# Patient Record
Sex: MALE | Race: WHITE | Employment: OTHER | URBAN - NONMETROPOLITAN AREA
[De-identification: names, ages, dates, MRNs, and addresses within clinical notes are randomized per-mention and may not be internally consistent; named-entity substitution may affect disease eponyms.]

---

## 2018-09-18 ENCOUNTER — HOSPITAL ENCOUNTER (OUTPATIENT)
Age: 78
Setting detail: SPECIMEN
Discharge: HOME | End: 2018-09-18
Payer: MEDICARE

## 2018-09-18 ENCOUNTER — OFFICE VISIT (OUTPATIENT)
Dept: UROLOGY | Age: 78
End: 2018-09-18
Payer: MEDICARE

## 2018-09-18 VITALS
HEIGHT: 72 IN | WEIGHT: 206 LBS | DIASTOLIC BLOOD PRESSURE: 106 MMHG | HEART RATE: 80 BPM | SYSTOLIC BLOOD PRESSURE: 160 MMHG | TEMPERATURE: 98.2 F | BODY MASS INDEX: 27.9 KG/M2

## 2018-09-18 DIAGNOSIS — R31.0 GROSS HEMATURIA: ICD-10-CM

## 2018-09-18 DIAGNOSIS — R31.0 GROSS HEMATURIA: Primary | ICD-10-CM

## 2018-09-18 PROBLEM — R31.9 HEMATURIA: Status: ACTIVE | Noted: 2018-09-18

## 2018-09-18 LAB
-: ABNORMAL
AMORPHOUS: ABNORMAL
BACTERIA: ABNORMAL
BILIRUBIN URINE: ABNORMAL
CASTS UA: ABNORMAL /LPF
COLOR: ABNORMAL
COMMENT UA: ABNORMAL
CRYSTALS, UA: ABNORMAL /HPF
EPITHELIAL CELLS UA: ABNORMAL /HPF (ref 0–5)
GLUCOSE URINE: NEGATIVE
KETONES, URINE: NEGATIVE
LEUKOCYTE ESTERASE, URINE: ABNORMAL
MUCUS: ABNORMAL
NITRITE, URINE: POSITIVE
OTHER OBSERVATIONS UA: ABNORMAL
PH UA: 6 (ref 5–9)
PROTEIN UA: ABNORMAL
RBC UA: ABNORMAL /HPF (ref 0–2)
RENAL EPITHELIAL, UA: ABNORMAL /HPF
SPECIFIC GRAVITY UA: 1.02 (ref 1.01–1.02)
TRICHOMONAS: ABNORMAL
TURBIDITY: ABNORMAL
URINE HGB: ABNORMAL
UROBILINOGEN, URINE: NORMAL
WBC UA: ABNORMAL /HPF (ref 0–5)
YEAST: ABNORMAL

## 2018-09-18 PROCEDURE — G8427 DOCREV CUR MEDS BY ELIG CLIN: HCPCS | Performed by: NURSE PRACTITIONER

## 2018-09-18 PROCEDURE — 4004F PT TOBACCO SCREEN RCVD TLK: CPT | Performed by: NURSE PRACTITIONER

## 2018-09-18 PROCEDURE — 87086 URINE CULTURE/COLONY COUNT: CPT

## 2018-09-18 PROCEDURE — G8419 CALC BMI OUT NRM PARAM NOF/U: HCPCS | Performed by: NURSE PRACTITIONER

## 2018-09-18 PROCEDURE — 1101F PT FALLS ASSESS-DOCD LE1/YR: CPT | Performed by: NURSE PRACTITIONER

## 2018-09-18 PROCEDURE — 99204 OFFICE O/P NEW MOD 45 MIN: CPT | Performed by: NURSE PRACTITIONER

## 2018-09-18 PROCEDURE — 4040F PNEUMOC VAC/ADMIN/RCVD: CPT | Performed by: NURSE PRACTITIONER

## 2018-09-18 PROCEDURE — 81001 URINALYSIS AUTO W/SCOPE: CPT

## 2018-09-18 PROCEDURE — 1123F ACP DISCUSS/DSCN MKR DOCD: CPT | Performed by: NURSE PRACTITIONER

## 2018-09-18 ASSESSMENT — ENCOUNTER SYMPTOMS
COUGH: 0
VOMITING: 0
COLOR CHANGE: 0
ABDOMINAL PAIN: 0
BACK PAIN: 0
SHORTNESS OF BREATH: 0
NAUSEA: 0
CONSTIPATION: 0
EYE REDNESS: 0
WHEEZING: 0
EYE PAIN: 0

## 2018-09-18 NOTE — PROGRESS NOTES
Subjective:      Patient ID: Shellie Chiu is a 69 y.o. male. HPI  Patient is a 69 y.o. male in no acute distress and alert and oriented to person, place and time. New patient referral from [facility] ER for gross hematuria. Patient has had gross hematuria for the last 5-6 days. He has had no pain with urination, flank or abdominal pain. He did go to the ER for evaluation on 9/14/2018. Renal labs reviewed: BUN 15.0, creatinine 0.93, GFR >60. Urinalysis showed trace of the sites and negative nitrite. No urine culture was completed. No imaging was obtained. Patient is a current smoker. He has smoked a 0.25 pack per day for 60 years. He did previously work in construction. He does follow with a urologist in [place]. He does spend 6 months of the year down in [place]. He reports he had a TURP completed 10 years ago for an enlarged prostate. He is no longer taking medications for BPH. Patient states since the surgery he has had intermittent gross hematuria. He denies ever having a CT completed. He reports he has had multiple cystoscopies and was told he is bleeding from \"scar tissue\". He reports his previous urologist give him \"60 of Cipro to have on hand to take if needed for hematuria. \"  He has also been following with his urologist for an elevated PSA. He states he has had a PSA as high as 13. He denies any current dysuria, frequency, urgency, incontinence, or nocturia. He does continue to have gross hematuria. PVR is 104 ML. Past Medical History:   Diagnosis Date    Enlarged prostate      Past Surgical History:   Procedure Laterality Date    TURP       Outpatient Encounter Prescriptions as of 9/18/2018   Medication Sig Dispense Refill    Multiple Vitamins-Minerals (MULTIVITAL PO) Take by mouth daily       No facility-administered encounter medications on file as of 9/18/2018. No current outpatient prescriptions on file prior to visit.      No current facility-administered medications

## 2018-09-19 LAB
CULTURE: NO GROWTH
Lab: NORMAL
SPECIMEN DESCRIPTION: NORMAL
STATUS: NORMAL

## 2018-09-20 ENCOUNTER — TELEPHONE (OUTPATIENT)
Dept: UROLOGY | Age: 78
End: 2018-09-20

## 2018-09-28 ENCOUNTER — HOSPITAL ENCOUNTER (OUTPATIENT)
Dept: CT IMAGING | Age: 78
Discharge: HOME | End: 2018-09-30
Payer: MEDICARE

## 2018-09-28 DIAGNOSIS — R31.0 GROSS HEMATURIA: ICD-10-CM

## 2018-09-28 PROCEDURE — 74178 CT ABD&PLV WO CNTR FLWD CNTR: CPT

## 2018-09-28 PROCEDURE — 6360000004 HC RX CONTRAST MEDICATION: Performed by: NURSE PRACTITIONER

## 2018-09-28 RX ADMIN — IOPAMIDOL 120 ML: 755 INJECTION, SOLUTION INTRAVENOUS at 11:19

## 2018-10-03 ENCOUNTER — HOSPITAL ENCOUNTER (OUTPATIENT)
Age: 78
Discharge: HOME | End: 2018-10-03
Payer: MEDICARE

## 2018-10-03 ENCOUNTER — TELEPHONE (OUTPATIENT)
Dept: UROLOGY | Age: 78
End: 2018-10-03

## 2018-10-03 ENCOUNTER — PROCEDURE VISIT (OUTPATIENT)
Dept: UROLOGY | Age: 78
End: 2018-10-03
Payer: MEDICARE

## 2018-10-03 VITALS — BODY MASS INDEX: 27.94 KG/M2 | WEIGHT: 206 LBS | DIASTOLIC BLOOD PRESSURE: 80 MMHG | SYSTOLIC BLOOD PRESSURE: 120 MMHG

## 2018-10-03 DIAGNOSIS — R31.0 GROSS HEMATURIA: Primary | ICD-10-CM

## 2018-10-03 DIAGNOSIS — N28.89 RENAL MASS: ICD-10-CM

## 2018-10-03 DIAGNOSIS — N21.0 BLADDER CALCULUS: ICD-10-CM

## 2018-10-03 LAB
EKG ATRIAL RATE: 68 BPM
EKG P AXIS: 61 DEGREES
EKG P-R INTERVAL: 188 MS
EKG Q-T INTERVAL: 406 MS
EKG QRS DURATION: 76 MS
EKG QTC CALCULATION (BAZETT): 412 MS
EKG R AXIS: 1 DEGREES
EKG T AXIS: 56 DEGREES
EKG VENTRICULAR RATE: 62 BPM

## 2018-10-03 PROCEDURE — G8484 FLU IMMUNIZE NO ADMIN: HCPCS | Performed by: UROLOGY

## 2018-10-03 PROCEDURE — 99214 OFFICE O/P EST MOD 30 MIN: CPT | Performed by: UROLOGY

## 2018-10-03 PROCEDURE — 4004F PT TOBACCO SCREEN RCVD TLK: CPT | Performed by: UROLOGY

## 2018-10-03 PROCEDURE — 1123F ACP DISCUSS/DSCN MKR DOCD: CPT | Performed by: UROLOGY

## 2018-10-03 PROCEDURE — 4040F PNEUMOC VAC/ADMIN/RCVD: CPT | Performed by: UROLOGY

## 2018-10-03 PROCEDURE — 93005 ELECTROCARDIOGRAM TRACING: CPT

## 2018-10-03 PROCEDURE — G8427 DOCREV CUR MEDS BY ELIG CLIN: HCPCS | Performed by: UROLOGY

## 2018-10-03 PROCEDURE — 1101F PT FALLS ASSESS-DOCD LE1/YR: CPT | Performed by: UROLOGY

## 2018-10-03 PROCEDURE — G8419 CALC BMI OUT NRM PARAM NOF/U: HCPCS | Performed by: UROLOGY

## 2018-10-03 ASSESSMENT — ENCOUNTER SYMPTOMS
NAUSEA: 0
COUGH: 0
WHEEZING: 0
SHORTNESS OF BREATH: 0
EYE PAIN: 0
ABDOMINAL PAIN: 0
COLOR CHANGE: 0
BACK PAIN: 0
VOMITING: 0
EYE REDNESS: 0

## 2018-10-03 NOTE — TELEPHONE ENCOUNTER
Patient called back this afternoon stating that he has decided to have surgery in [place]. 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 He states he would be there for 3 months. He is asking if records can be sent to urologist, Dr. Mickey Gonzalez in [place]. Fax #[phone].   Phone # [phone]

## 2025-03-18 ENCOUNTER — HOSPITAL ENCOUNTER (OUTPATIENT)
Dept: PREADMISSION TESTING | Age: 85
Discharge: HOME | End: 2025-03-22
Attending: ORTHOPAEDIC SURGERY | Admitting: ORTHOPAEDIC SURGERY
Payer: MEDICARE

## 2025-03-18 ENCOUNTER — HOSPITAL ENCOUNTER (OUTPATIENT)
Dept: GENERAL RADIOLOGY | Age: 85
Discharge: HOME | End: 2025-03-20
Attending: ORTHOPAEDIC SURGERY
Payer: MEDICARE

## 2025-03-18 VITALS
HEART RATE: 70 BPM | RESPIRATION RATE: 14 BRPM | BODY MASS INDEX: 29.8 KG/M2 | DIASTOLIC BLOOD PRESSURE: 79 MMHG | WEIGHT: 220 LBS | TEMPERATURE: 97.4 F | HEIGHT: 72 IN | OXYGEN SATURATION: 94 % | SYSTOLIC BLOOD PRESSURE: 151 MMHG

## 2025-03-18 LAB
ABO + RH BLD: NORMAL
ALBUMIN SERPL-MCNC: 4 G/DL (ref 3.5–5.2)
ALBUMIN/GLOB SERPL: 1.4 {RATIO} (ref 1–2.5)
ALP SERPL-CCNC: 105 U/L (ref 40–129)
ALT SERPL-CCNC: 17 U/L (ref 10–50)
ANION GAP SERPL CALCULATED.3IONS-SCNC: 8 MMOL/L (ref 9–16)
ARM BAND NUMBER: NORMAL
AST SERPL-CCNC: 21 U/L (ref 10–50)
BASOPHILS # BLD: 0.06 K/UL (ref 0–0.2)
BASOPHILS NFR BLD: 1 % (ref 0–2)
BILIRUB SERPL-MCNC: 0.2 MG/DL (ref 0–1.2)
BILIRUB UR QL STRIP: NEGATIVE
BLOOD BANK SAMPLE EXPIRATION: NORMAL
BLOOD GROUP ANTIBODIES SERPL: NEGATIVE
BUN SERPL-MCNC: 14 MG/DL (ref 8–23)
BUN/CREAT SERPL: 16 (ref 9–20)
CALCIUM SERPL-MCNC: 9.2 MG/DL (ref 8.6–10.4)
CHLORIDE SERPL-SCNC: 108 MMOL/L (ref 98–107)
CLARITY UR: CLEAR
CO2 SERPL-SCNC: 28 MMOL/L (ref 20–31)
COLOR UR: YELLOW
CREAT SERPL-MCNC: 0.9 MG/DL (ref 0.7–1.2)
EKG ATRIAL RATE: 67 BPM
EKG P AXIS: 41 DEGREES
EKG P-R INTERVAL: 196 MS
EKG Q-T INTERVAL: 380 MS
EKG QRS DURATION: 70 MS
EKG QTC CALCULATION (BAZETT): 401 MS
EKG R AXIS: -26 DEGREES
EKG T AXIS: 54 DEGREES
EKG VENTRICULAR RATE: 67 BPM
EOSINOPHIL # BLD: 0.1 K/UL (ref 0–0.44)
EOSINOPHILS RELATIVE PERCENT: 2 % (ref 1–4)
EPI CELLS #/AREA URNS HPF: ABNORMAL /HPF (ref 0–5)
ERYTHROCYTE [DISTWIDTH] IN BLOOD BY AUTOMATED COUNT: 13.5 % (ref 11.8–14.4)
GFR, ESTIMATED: 80 ML/MIN/1.73M2
GLUCOSE SERPL-MCNC: 87 MG/DL (ref 74–99)
GLUCOSE UR STRIP-MCNC: NEGATIVE MG/DL
HCT VFR BLD AUTO: 45.6 % (ref 40.7–50.3)
HGB BLD-MCNC: 15 G/DL (ref 13–17)
HGB UR QL STRIP.AUTO: NEGATIVE
IMM GRANULOCYTES # BLD AUTO: <0.03 K/UL (ref 0–0.3)
IMM GRANULOCYTES NFR BLD: 0 %
INR PPP: 1
KETONES UR STRIP-MCNC: NEGATIVE MG/DL
LEUKOCYTE ESTERASE UR QL STRIP: NEGATIVE
LYMPHOCYTES NFR BLD: 1.73 K/UL (ref 1.1–3.7)
LYMPHOCYTES RELATIVE PERCENT: 29 % (ref 24–43)
MCH RBC QN AUTO: 31.2 PG (ref 25.2–33.5)
MCHC RBC AUTO-ENTMCNC: 32.9 G/DL (ref 28.4–34.8)
MCV RBC AUTO: 94.8 FL (ref 82.6–102.9)
MONOCYTES NFR BLD: 0.75 K/UL (ref 0.1–1.2)
MONOCYTES NFR BLD: 13 % (ref 3–12)
MUCOUS THREADS URNS QL MICRO: ABNORMAL
NEUTROPHILS NFR BLD: 55 % (ref 36–65)
NEUTS SEG NFR BLD: 3.26 K/UL (ref 1.5–8.1)
NITRITE UR QL STRIP: NEGATIVE
NRBC BLD-RTO: 0 PER 100 WBC
PARTIAL THROMBOPLASTIN TIME: 27.2 SEC (ref 26.8–34.8)
PH UR STRIP: 6 [PH] (ref 5–9)
PLATELET # BLD AUTO: 252 K/UL (ref 138–453)
PMV BLD AUTO: 10 FL (ref 8.1–13.5)
POTASSIUM SERPL-SCNC: 3.9 MMOL/L (ref 3.7–5.3)
PROT SERPL-MCNC: 6.9 G/DL (ref 6.6–8.7)
PROT UR STRIP-MCNC: ABNORMAL MG/DL
PROTHROMBIN TIME: 12.7 SEC (ref 11.7–14.1)
RBC # BLD AUTO: 4.81 M/UL (ref 4.21–5.77)
RBC #/AREA URNS HPF: ABNORMAL /HPF (ref 0–2)
SODIUM SERPL-SCNC: 144 MMOL/L (ref 136–145)
SP GR UR STRIP: >1.03 (ref 1.01–1.02)
UROBILINOGEN UR STRIP-ACNC: NORMAL EU/DL (ref 0–1)
WBC #/AREA URNS HPF: ABNORMAL /HPF (ref 0–5)
WBC OTHER # BLD: 5.9 K/UL (ref 3.5–11.3)

## 2025-03-18 PROCEDURE — 86900 BLOOD TYPING SEROLOGIC ABO: CPT

## 2025-03-18 PROCEDURE — 71046 X-RAY EXAM CHEST 2 VIEWS: CPT

## 2025-03-18 PROCEDURE — 86901 BLOOD TYPING SEROLOGIC RH(D): CPT

## 2025-03-18 PROCEDURE — 93005 ELECTROCARDIOGRAM TRACING: CPT | Performed by: ORTHOPAEDIC SURGERY

## 2025-03-18 PROCEDURE — 86850 RBC ANTIBODY SCREEN: CPT

## 2025-03-18 PROCEDURE — 85025 COMPLETE CBC W/AUTO DIFF WBC: CPT

## 2025-03-18 PROCEDURE — 93010 ELECTROCARDIOGRAM REPORT: CPT | Performed by: INTERNAL MEDICINE

## 2025-03-18 PROCEDURE — 81001 URINALYSIS AUTO W/SCOPE: CPT

## 2025-03-18 PROCEDURE — 85610 PROTHROMBIN TIME: CPT

## 2025-03-18 PROCEDURE — 85730 THROMBOPLASTIN TIME PARTIAL: CPT

## 2025-03-18 PROCEDURE — 36415 COLL VENOUS BLD VENIPUNCTURE: CPT

## 2025-03-18 PROCEDURE — 80053 COMPREHEN METABOLIC PANEL: CPT

## 2025-03-18 PROCEDURE — 87641 MR-STAPH DNA AMP PROBE: CPT

## 2025-03-18 RX ORDER — TAMSULOSIN HYDROCHLORIDE 0.4 MG/1
0.4 CAPSULE ORAL PRN
COMMUNITY

## 2025-03-18 RX ORDER — IBUPROFEN 200 MG
200 TABLET ORAL PRN
COMMUNITY

## 2025-03-18 RX ORDER — PANTOPRAZOLE SODIUM 20 MG/1
20 TABLET, DELAYED RELEASE ORAL PRN
COMMUNITY

## 2025-03-18 RX ORDER — ASPIRIN 325 MG
325 TABLET ORAL PRN
COMMUNITY

## 2025-03-18 RX ORDER — ACETAMINOPHEN 325 MG/1
650 TABLET ORAL PRN
COMMUNITY

## 2025-03-18 RX ORDER — LOSARTAN POTASSIUM 50 MG/1
50 TABLET ORAL DAILY
COMMUNITY

## 2025-03-18 ASSESSMENT — PAIN DESCRIPTION - ORIENTATION: ORIENTATION: RIGHT

## 2025-03-18 ASSESSMENT — PAIN DESCRIPTION - LOCATION: LOCATION: KNEE

## 2025-03-18 ASSESSMENT — PAIN SCALES - GENERAL: PAINLEVEL_OUTOF10: 5

## 2025-03-18 NOTE — PROGRESS NOTES
[facility]   Preadmission Testing    Name: Jaden Doe  : 1940  Patient Phone: [phone] (home)     Procedure Right TKA  Date of Procedure: 25  Surgeon: Koffi Perez MD    Ht:  182.9 cm (6')  Wt: 99.8 kg (220 lb)  Wt method: Stated    Allergies: No Known Allergies    Peanut allergy: No         Vitals:    25 1307   BP: (!) 151/79   Pulse: 70   Resp: 14   Temp: 97.4 °F (36.3 °C)   SpO2: 94%       No LMP for male patient.    Do you take blood thinners?   [x] Yes    [x] No         Instructed to stop blood thinners prior to procedure?    [x] Yes    [] No      [] N/A   Do you have sleep apnea?   [] Yes    [x] No     Instructed to bring CPAP machine?   [] Yes    [] No    [x] N/A   Do you have acid reflux ?   [x] Yes    [] No     Do you have  hiatal hernia?   [] Yes    [x] No    Do you ever experience motion sickness?   [] Yes    [x] No     Have you had a respiratory infection or sore throat in last 4 weeks before surgery?    [] Yes    [x] No     Do you have poorly controlled asthma or COPD?   [] Yes    [x] No     Do you have a history of angina in the last month or symptomatic arrhythmia?   [] Yes    [x] No     Do you have significant central nervous system disease?   [] Yes    [x] No     Have you had an EKG, labs, or chest xray in last 12 months?  If yes provide copies to anesthesia   [] Yes    [x] No       [] Lab    [] EKG    [] CXR     Have you had a stress test?     [x] Yes    [] No    When/where:10yrs ago/[place]    Was it normal?    [x] Yes    [] No   Do you or your family have a history of Malignant Hyperthermia? [] Yes    [x] No     Patient instructed on:   [x] NPO Status   [x] Meds to Take Day of Surgery  [x] Ride Home  [x]No Jewelry/Contact Lenses/Dentures day of surgery   [x] Chlorhexidene                       Patient instructed on the pre-operative, intra-operative, and post-operative process?   Yes  Medication instructions reviewed with patient?  Yes  Pre operative

## 2025-03-19 LAB
MRSA, DNA, NASAL: ABNORMAL
SPECIMEN DESCRIPTION: ABNORMAL

## 2025-03-27 ENCOUNTER — TELEPHONE (OUTPATIENT)
Dept: PREADMISSION TESTING | Age: 85
End: 2025-03-27

## 2025-04-02 ENCOUNTER — ANESTHESIA EVENT (OUTPATIENT)
Dept: OPERATING ROOM | Age: 85
End: 2025-04-02
Payer: MEDICARE

## 2025-04-03 ENCOUNTER — APPOINTMENT (OUTPATIENT)
Dept: GENERAL RADIOLOGY | Age: 85
End: 2025-04-03
Attending: ORTHOPAEDIC SURGERY
Payer: MEDICARE

## 2025-04-03 ENCOUNTER — HOSPITAL ENCOUNTER (OUTPATIENT)
Age: 85
Setting detail: OBSERVATION
Discharge: HOME | End: 2025-04-04
Attending: ORTHOPAEDIC SURGERY | Admitting: ORTHOPAEDIC SURGERY
Payer: MEDICARE

## 2025-04-03 ENCOUNTER — ANESTHESIA (OUTPATIENT)
Dept: OPERATING ROOM | Age: 85
End: 2025-04-03
Payer: MEDICARE

## 2025-04-03 DIAGNOSIS — Z96.651 S/P TOTAL KNEE ARTHROPLASTY, RIGHT: Primary | ICD-10-CM

## 2025-04-03 PROCEDURE — C1713 ANCHOR/SCREW BN/BN,TIS/BN: HCPCS | Performed by: ORTHOPAEDIC SURGERY

## 2025-04-03 PROCEDURE — 2720000010 HC SURG SUPPLY STERILE: Performed by: ORTHOPAEDIC SURGERY

## 2025-04-03 PROCEDURE — 3700000000 HC ANESTHESIA ATTENDED CARE: Performed by: ORTHOPAEDIC SURGERY

## 2025-04-03 PROCEDURE — 73560 X-RAY EXAM OF KNEE 1 OR 2: CPT

## 2025-04-03 PROCEDURE — 6370000000 HC RX 637 (ALT 250 FOR IP): Performed by: PHYSICIAN ASSISTANT

## 2025-04-03 PROCEDURE — 2709999900 HC NON-CHARGEABLE SUPPLY: Performed by: ORTHOPAEDIC SURGERY

## 2025-04-03 PROCEDURE — 3600000015 HC SURGERY LEVEL 5 ADDTL 15MIN: Performed by: ORTHOPAEDIC SURGERY

## 2025-04-03 PROCEDURE — 94761 N-INVAS EAR/PLS OXIMETRY MLT: CPT

## 2025-04-03 PROCEDURE — 6370000000 HC RX 637 (ALT 250 FOR IP): Performed by: NURSE ANESTHETIST, CERTIFIED REGISTERED

## 2025-04-03 PROCEDURE — G0378 HOSPITAL OBSERVATION PER HR: HCPCS

## 2025-04-03 PROCEDURE — 2580000003 HC RX 258

## 2025-04-03 PROCEDURE — 6360000002 HC RX W HCPCS

## 2025-04-03 PROCEDURE — 3700000001 HC ADD 15 MINUTES (ANESTHESIA): Performed by: ORTHOPAEDIC SURGERY

## 2025-04-03 PROCEDURE — 2500000003 HC RX 250 WO HCPCS: Performed by: ORTHOPAEDIC SURGERY

## 2025-04-03 PROCEDURE — 6360000002 HC RX W HCPCS: Performed by: PHYSICIAN ASSISTANT

## 2025-04-03 PROCEDURE — 2580000003 HC RX 258: Performed by: PHYSICIAN ASSISTANT

## 2025-04-03 PROCEDURE — 64447 NJX AA&/STRD FEMORAL NRV IMG: CPT | Performed by: NURSE ANESTHETIST, CERTIFIED REGISTERED

## 2025-04-03 PROCEDURE — 2580000003 HC RX 258: Performed by: NURSE ANESTHETIST, CERTIFIED REGISTERED

## 2025-04-03 PROCEDURE — 3600000005 HC SURGERY LEVEL 5 BASE: Performed by: ORTHOPAEDIC SURGERY

## 2025-04-03 PROCEDURE — 2500000003 HC RX 250 WO HCPCS: Performed by: PHYSICIAN ASSISTANT

## 2025-04-03 PROCEDURE — C1776 JOINT DEVICE (IMPLANTABLE): HCPCS | Performed by: ORTHOPAEDIC SURGERY

## 2025-04-03 PROCEDURE — 7100000000 HC PACU RECOVERY - FIRST 15 MIN: Performed by: ORTHOPAEDIC SURGERY

## 2025-04-03 PROCEDURE — 51798 US URINE CAPACITY MEASURE: CPT

## 2025-04-03 PROCEDURE — 7100000001 HC PACU RECOVERY - ADDTL 15 MIN: Performed by: ORTHOPAEDIC SURGERY

## 2025-04-03 PROCEDURE — 2500000003 HC RX 250 WO HCPCS: Performed by: NURSE ANESTHETIST, CERTIFIED REGISTERED

## 2025-04-03 DEVICE — COMPONENT PAT DIA38MM THK9.5MM STD VIVACIT-E CEM INSET FOR: Type: IMPLANTABLE DEVICE | Site: KNEE | Status: FUNCTIONAL

## 2025-04-03 DEVICE — CEMENT BNE 40GM HI VISC RADPQ FOR REV SURG: Type: IMPLANTABLE DEVICE | Site: KNEE | Status: FUNCTIONAL

## 2025-04-03 DEVICE — IMPLANTABLE DEVICE: Type: IMPLANTABLE DEVICE | Site: KNEE | Status: FUNCTIONAL

## 2025-04-03 DEVICE — PSN TIB STM 5 DEG SZ H R: Type: IMPLANTABLE DEVICE | Site: KNEE | Status: FUNCTIONAL

## 2025-04-03 DEVICE — PSN MC VE ASF R 10MM 8-11 GH: Type: IMPLANTABLE DEVICE | Site: KNEE | Status: FUNCTIONAL

## 2025-04-03 RX ORDER — ONDANSETRON 4 MG/1
4 TABLET, ORALLY DISINTEGRATING ORAL EVERY 8 HOURS PRN
Status: DISCONTINUED | OUTPATIENT
Start: 2025-04-03 | End: 2025-04-04 | Stop reason: HOSPADM

## 2025-04-03 RX ORDER — SODIUM CHLORIDE 9 MG/ML
INJECTION, SOLUTION INTRAVENOUS PRN
Status: DISCONTINUED | OUTPATIENT
Start: 2025-04-03 | End: 2025-04-03 | Stop reason: HOSPADM

## 2025-04-03 RX ORDER — BUPIVACAINE HYDROCHLORIDE 7.5 MG/ML
INJECTION, SOLUTION INTRASPINAL
Status: DISCONTINUED | OUTPATIENT
Start: 2025-04-03 | End: 2025-04-03 | Stop reason: SDUPTHER

## 2025-04-03 RX ORDER — SODIUM CHLORIDE 0.9 % (FLUSH) 0.9 %
5-40 SYRINGE (ML) INJECTION PRN
Status: DISCONTINUED | OUTPATIENT
Start: 2025-04-03 | End: 2025-04-03 | Stop reason: HOSPADM

## 2025-04-03 RX ORDER — OXYCODONE HYDROCHLORIDE 5 MG/1
10 TABLET ORAL PRN
Status: DISCONTINUED | OUTPATIENT
Start: 2025-04-03 | End: 2025-04-03 | Stop reason: HOSPADM

## 2025-04-03 RX ORDER — DEXAMETHASONE SODIUM PHOSPHATE 10 MG/ML
INJECTION, SOLUTION INTRA-ARTICULAR; INTRALESIONAL; INTRAMUSCULAR; INTRAVENOUS; SOFT TISSUE
Status: DISCONTINUED | OUTPATIENT
Start: 2025-04-03 | End: 2025-04-03 | Stop reason: SDUPTHER

## 2025-04-03 RX ORDER — SODIUM CHLORIDE 9 MG/ML
INJECTION, SOLUTION INTRAVENOUS CONTINUOUS
Status: DISCONTINUED | OUTPATIENT
Start: 2025-04-03 | End: 2025-04-03

## 2025-04-03 RX ORDER — HYDRALAZINE HYDROCHLORIDE 20 MG/ML
10 INJECTION INTRAMUSCULAR; INTRAVENOUS ONCE
Status: COMPLETED | OUTPATIENT
Start: 2025-04-03 | End: 2025-04-03

## 2025-04-03 RX ORDER — OXYCODONE HYDROCHLORIDE 5 MG/1
10 TABLET ORAL EVERY 4 HOURS PRN
Status: DISCONTINUED | OUTPATIENT
Start: 2025-04-03 | End: 2025-04-04 | Stop reason: HOSPADM

## 2025-04-03 RX ORDER — BUPIVACAINE/KETOROLAC/KETAMINE 150-60/50
SYRINGE (ML) INJECTION PRN
Status: DISCONTINUED | OUTPATIENT
Start: 2025-04-03 | End: 2025-04-03 | Stop reason: ALTCHOICE

## 2025-04-03 RX ORDER — DIPHENHYDRAMINE HYDROCHLORIDE 50 MG/ML
25 INJECTION, SOLUTION INTRAMUSCULAR; INTRAVENOUS EVERY 6 HOURS PRN
Status: DISCONTINUED | OUTPATIENT
Start: 2025-04-03 | End: 2025-04-04 | Stop reason: HOSPADM

## 2025-04-03 RX ORDER — TAMSULOSIN HYDROCHLORIDE 0.4 MG/1
0.4 CAPSULE ORAL PRN
Status: DISCONTINUED | OUTPATIENT
Start: 2025-04-03 | End: 2025-04-03

## 2025-04-03 RX ORDER — NALOXONE HYDROCHLORIDE 0.4 MG/ML
INJECTION, SOLUTION INTRAMUSCULAR; INTRAVENOUS; SUBCUTANEOUS PRN
Status: DISCONTINUED | OUTPATIENT
Start: 2025-04-03 | End: 2025-04-03 | Stop reason: HOSPADM

## 2025-04-03 RX ORDER — SODIUM CHLORIDE 0.9 % (FLUSH) 0.9 %
5-40 SYRINGE (ML) INJECTION EVERY 12 HOURS SCHEDULED
Status: DISCONTINUED | OUTPATIENT
Start: 2025-04-03 | End: 2025-04-04 | Stop reason: HOSPADM

## 2025-04-03 RX ORDER — ACETAMINOPHEN 325 MG/1
650 TABLET ORAL EVERY 6 HOURS
Status: DISCONTINUED | OUTPATIENT
Start: 2025-04-03 | End: 2025-04-04 | Stop reason: HOSPADM

## 2025-04-03 RX ORDER — GLYCOPYRROLATE 0.2 MG/ML
INJECTION INTRAMUSCULAR; INTRAVENOUS
Status: DISCONTINUED | OUTPATIENT
Start: 2025-04-03 | End: 2025-04-03 | Stop reason: SDUPTHER

## 2025-04-03 RX ORDER — ONDANSETRON 2 MG/ML
4 INJECTION INTRAMUSCULAR; INTRAVENOUS
Status: DISCONTINUED | OUTPATIENT
Start: 2025-04-03 | End: 2025-04-03 | Stop reason: HOSPADM

## 2025-04-03 RX ORDER — ACETAMINOPHEN 325 MG/1
650 TABLET ORAL ONCE
Status: DISCONTINUED | OUTPATIENT
Start: 2025-04-03 | End: 2025-04-03 | Stop reason: HOSPADM

## 2025-04-03 RX ORDER — FENTANYL CITRATE 50 UG/ML
25 INJECTION, SOLUTION INTRAMUSCULAR; INTRAVENOUS EVERY 5 MIN PRN
Status: DISCONTINUED | OUTPATIENT
Start: 2025-04-03 | End: 2025-04-03 | Stop reason: HOSPADM

## 2025-04-03 RX ORDER — ROPIVACAINE HYDROCHLORIDE 5 MG/ML
INJECTION, SOLUTION EPIDURAL; INFILTRATION; PERINEURAL
Status: DISCONTINUED | OUTPATIENT
Start: 2025-04-03 | End: 2025-04-03

## 2025-04-03 RX ORDER — NAPROXEN 375 MG/1
375 TABLET ORAL 2 TIMES DAILY WITH MEALS
COMMUNITY

## 2025-04-03 RX ORDER — ROPIVACAINE HYDROCHLORIDE 5 MG/ML
INJECTION, SOLUTION EPIDURAL; INFILTRATION; PERINEURAL
Status: DISCONTINUED | OUTPATIENT
Start: 2025-04-03 | End: 2025-04-03 | Stop reason: SDUPTHER

## 2025-04-03 RX ORDER — SODIUM CHLORIDE 0.9 % (FLUSH) 0.9 %
5-40 SYRINGE (ML) INJECTION EVERY 12 HOURS SCHEDULED
Status: DISCONTINUED | OUTPATIENT
Start: 2025-04-03 | End: 2025-04-03 | Stop reason: HOSPADM

## 2025-04-03 RX ORDER — SODIUM CHLORIDE 9 MG/ML
INJECTION, SOLUTION INTRAMUSCULAR; INTRAVENOUS; SUBCUTANEOUS
Status: DISCONTINUED | OUTPATIENT
Start: 2025-04-03 | End: 2025-04-03 | Stop reason: SDUPTHER

## 2025-04-03 RX ORDER — BUPIVACAINE/KETOROLAC/KETAMINE 150-60/50
SYRINGE (ML) INJECTION
Status: DISPENSED
Start: 2025-04-03 | End: 2025-04-04

## 2025-04-03 RX ORDER — FENTANYL CITRATE 50 UG/ML
INJECTION, SOLUTION INTRAMUSCULAR; INTRAVENOUS
Status: DISCONTINUED | OUTPATIENT
Start: 2025-04-03 | End: 2025-04-03 | Stop reason: SDUPTHER

## 2025-04-03 RX ORDER — SODIUM CHLORIDE 9 MG/ML
INJECTION, SOLUTION INTRAVENOUS PRN
Status: DISCONTINUED | OUTPATIENT
Start: 2025-04-03 | End: 2025-04-04 | Stop reason: HOSPADM

## 2025-04-03 RX ORDER — OXYCODONE HYDROCHLORIDE 5 MG/1
5 TABLET ORAL EVERY 6 HOURS PRN
Qty: 20 TABLET | Refills: 0 | Status: SHIPPED | OUTPATIENT
Start: 2025-04-03 | End: 2025-04-08

## 2025-04-03 RX ORDER — SODIUM CHLORIDE, SODIUM LACTATE, POTASSIUM CHLORIDE, CALCIUM CHLORIDE 600; 310; 30; 20 MG/100ML; MG/100ML; MG/100ML; MG/100ML
INJECTION, SOLUTION INTRAVENOUS CONTINUOUS
Status: DISCONTINUED | OUTPATIENT
Start: 2025-04-03 | End: 2025-04-03 | Stop reason: HOSPADM

## 2025-04-03 RX ORDER — CEFAZOLIN SODIUM/WATER 2 G/20 ML
2000 SYRINGE (ML) INTRAVENOUS
Status: COMPLETED | OUTPATIENT
Start: 2025-04-03 | End: 2025-04-03

## 2025-04-03 RX ORDER — PROPOFOL 10 MG/ML
INJECTION, EMULSION INTRAVENOUS
Status: DISCONTINUED | OUTPATIENT
Start: 2025-04-03 | End: 2025-04-03 | Stop reason: SDUPTHER

## 2025-04-03 RX ORDER — DIPHENHYDRAMINE HCL 25 MG
25 CAPSULE ORAL EVERY 6 HOURS PRN
Status: DISCONTINUED | OUTPATIENT
Start: 2025-04-03 | End: 2025-04-04 | Stop reason: HOSPADM

## 2025-04-03 RX ORDER — DEXAMETHASONE SODIUM PHOSPHATE 4 MG/ML
4 INJECTION, SOLUTION INTRA-ARTICULAR; INTRALESIONAL; INTRAMUSCULAR; INTRAVENOUS; SOFT TISSUE
Status: DISCONTINUED | OUTPATIENT
Start: 2025-04-03 | End: 2025-04-03 | Stop reason: HOSPADM

## 2025-04-03 RX ORDER — TRANEXAMIC ACID 100 MG/ML
INJECTION, SOLUTION INTRAVENOUS
Status: DISCONTINUED | OUTPATIENT
Start: 2025-04-03 | End: 2025-04-03 | Stop reason: SDUPTHER

## 2025-04-03 RX ORDER — CEFAZOLIN SODIUM/WATER 2 G/20 ML
2000 SYRINGE (ML) INTRAVENOUS EVERY 8 HOURS
Status: DISCONTINUED | OUTPATIENT
Start: 2025-04-03 | End: 2025-04-04 | Stop reason: HOSPADM

## 2025-04-03 RX ORDER — BISACODYL 5 MG/1
5 TABLET, DELAYED RELEASE ORAL DAILY PRN
Status: DISCONTINUED | OUTPATIENT
Start: 2025-04-03 | End: 2025-04-04 | Stop reason: HOSPADM

## 2025-04-03 RX ORDER — ACETAMINOPHEN 500 MG
1000 TABLET ORAL ONCE
Status: COMPLETED | OUTPATIENT
Start: 2025-04-03 | End: 2025-04-03

## 2025-04-03 RX ORDER — ASPIRIN 325 MG
325 TABLET, DELAYED RELEASE (ENTERIC COATED) ORAL DAILY
Status: DISCONTINUED | OUTPATIENT
Start: 2025-04-04 | End: 2025-04-04 | Stop reason: HOSPADM

## 2025-04-03 RX ORDER — POLYETHYLENE GLYCOL 3350 17 G/17G
17 POWDER, FOR SOLUTION ORAL DAILY
Status: DISCONTINUED | OUTPATIENT
Start: 2025-04-03 | End: 2025-04-04 | Stop reason: HOSPADM

## 2025-04-03 RX ORDER — ONDANSETRON 2 MG/ML
INJECTION INTRAMUSCULAR; INTRAVENOUS
Status: DISCONTINUED | OUTPATIENT
Start: 2025-04-03 | End: 2025-04-03 | Stop reason: SDUPTHER

## 2025-04-03 RX ORDER — DEXAMETHASONE SODIUM PHOSPHATE 4 MG/ML
INJECTION, SOLUTION INTRA-ARTICULAR; INTRALESIONAL; INTRAMUSCULAR; INTRAVENOUS; SOFT TISSUE
Status: DISCONTINUED | OUTPATIENT
Start: 2025-04-03 | End: 2025-04-03 | Stop reason: SDUPTHER

## 2025-04-03 RX ORDER — PANTOPRAZOLE SODIUM 20 MG/1
20 TABLET, DELAYED RELEASE ORAL DAILY PRN
Status: DISCONTINUED | OUTPATIENT
Start: 2025-04-03 | End: 2025-04-04 | Stop reason: HOSPADM

## 2025-04-03 RX ORDER — OXYCODONE HYDROCHLORIDE 5 MG/1
5 TABLET ORAL PRN
Status: DISCONTINUED | OUTPATIENT
Start: 2025-04-03 | End: 2025-04-03 | Stop reason: HOSPADM

## 2025-04-03 RX ORDER — ONDANSETRON 2 MG/ML
4 INJECTION INTRAMUSCULAR; INTRAVENOUS EVERY 6 HOURS PRN
Status: DISCONTINUED | OUTPATIENT
Start: 2025-04-03 | End: 2025-04-04 | Stop reason: HOSPADM

## 2025-04-03 RX ORDER — TAMSULOSIN HYDROCHLORIDE 0.4 MG/1
0.4 CAPSULE ORAL DAILY
Status: DISCONTINUED | OUTPATIENT
Start: 2025-04-03 | End: 2025-04-04 | Stop reason: HOSPADM

## 2025-04-03 RX ORDER — OXYCODONE HYDROCHLORIDE 5 MG/1
5 TABLET ORAL EVERY 4 HOURS PRN
Status: DISCONTINUED | OUTPATIENT
Start: 2025-04-03 | End: 2025-04-04 | Stop reason: HOSPADM

## 2025-04-03 RX ORDER — DIMENHYDRINATE 50 MG
50 TABLET ORAL ONCE
Status: COMPLETED | OUTPATIENT
Start: 2025-04-03 | End: 2025-04-03

## 2025-04-03 RX ORDER — FENTANYL CITRATE 50 UG/ML
50 INJECTION, SOLUTION INTRAMUSCULAR; INTRAVENOUS EVERY 5 MIN PRN
Status: DISCONTINUED | OUTPATIENT
Start: 2025-04-03 | End: 2025-04-03 | Stop reason: HOSPADM

## 2025-04-03 RX ORDER — LOSARTAN POTASSIUM 50 MG/1
50 TABLET ORAL DAILY
Status: DISCONTINUED | OUTPATIENT
Start: 2025-04-04 | End: 2025-04-04 | Stop reason: HOSPADM

## 2025-04-03 RX ORDER — SODIUM CHLORIDE 0.9 % (FLUSH) 0.9 %
5-40 SYRINGE (ML) INJECTION PRN
Status: DISCONTINUED | OUTPATIENT
Start: 2025-04-03 | End: 2025-04-04 | Stop reason: HOSPADM

## 2025-04-03 RX ORDER — SODIUM CHLORIDE 9 MG/ML
INJECTION, SOLUTION INTRAVENOUS CONTINUOUS
Status: DISCONTINUED | OUTPATIENT
Start: 2025-04-03 | End: 2025-04-03 | Stop reason: HOSPADM

## 2025-04-03 RX ORDER — SENNOSIDES A AND B 8.6 MG/1
1 TABLET, FILM COATED ORAL DAILY PRN
Status: DISCONTINUED | OUTPATIENT
Start: 2025-04-03 | End: 2025-04-04 | Stop reason: HOSPADM

## 2025-04-03 RX ADMIN — SODIUM CHLORIDE 20 ML: 9 INJECTION, SOLUTION INTRAMUSCULAR; INTRAVENOUS; SUBCUTANEOUS at 11:38

## 2025-04-03 RX ADMIN — SODIUM CHLORIDE, POTASSIUM CHLORIDE, SODIUM LACTATE AND CALCIUM CHLORIDE: 600; 310; 30; 20 INJECTION, SOLUTION INTRAVENOUS at 11:15

## 2025-04-03 RX ADMIN — ONDANSETRON 8 MG: 2 INJECTION, SOLUTION INTRAMUSCULAR; INTRAVENOUS at 12:32

## 2025-04-03 RX ADMIN — SODIUM CHLORIDE: 0.9 INJECTION, SOLUTION INTRAVENOUS at 17:11

## 2025-04-03 RX ADMIN — FENTANYL CITRATE 100 MCG: 50 INJECTION INTRAMUSCULAR; INTRAVENOUS at 11:38

## 2025-04-03 RX ADMIN — TRANEXAMIC ACID 1000 MG: 100 INJECTION, SOLUTION INTRAVENOUS at 12:48

## 2025-04-03 RX ADMIN — TRANEXAMIC ACID 1000 MG: 100 INJECTION, SOLUTION INTRAVENOUS at 14:37

## 2025-04-03 RX ADMIN — PROPOFOL 70 MG: 10 INJECTION, EMULSION INTRAVENOUS at 12:43

## 2025-04-03 RX ADMIN — ACETAMINOPHEN 650 MG: 325 TABLET ORAL at 17:02

## 2025-04-03 RX ADMIN — ACETAMINOPHEN 650 MG: 325 TABLET ORAL at 23:00

## 2025-04-03 RX ADMIN — SODIUM CHLORIDE, PRESERVATIVE FREE 10 ML: 5 INJECTION INTRAVENOUS at 20:50

## 2025-04-03 RX ADMIN — Medication 2000 MG: at 12:46

## 2025-04-03 RX ADMIN — SODIUM CHLORIDE, POTASSIUM CHLORIDE, SODIUM LACTATE AND CALCIUM CHLORIDE: 600; 310; 30; 20 INJECTION, SOLUTION INTRAVENOUS at 13:05

## 2025-04-03 RX ADMIN — ACETAMINOPHEN 1000 MG: 500 TABLET ORAL at 11:12

## 2025-04-03 RX ADMIN — POLYETHYLENE GLYCOL 3350 17 G: 17 POWDER, FOR SOLUTION ORAL at 17:22

## 2025-04-03 RX ADMIN — DEXAMETHASONE SODIUM PHOSPHATE 4 MG: 4 INJECTION INTRA-ARTICULAR; INTRALESIONAL; INTRAMUSCULAR; INTRAVENOUS; SOFT TISSUE at 12:50

## 2025-04-03 RX ADMIN — ROPIVACAINE HYDROCHLORIDE 15 ML: 5 INJECTION EPIDURAL; INFILTRATION; PERINEURAL at 11:38

## 2025-04-03 RX ADMIN — DEXAMETHASONE SODIUM PHOSPHATE 10 MG: 10 INJECTION INTRAMUSCULAR; INTRAVENOUS at 11:38

## 2025-04-03 RX ADMIN — Medication 2000 MG: at 20:50

## 2025-04-03 RX ADMIN — GLYCOPYRROLATE 0.2 MG: 0.2 INJECTION INTRAMUSCULAR; INTRAVENOUS at 13:08

## 2025-04-03 RX ADMIN — PROPOFOL 80 MCG/KG/MIN: 10 INJECTION, EMULSION INTRAVENOUS at 12:44

## 2025-04-03 RX ADMIN — HYDRALAZINE HYDROCHLORIDE 10 MG: 20 INJECTION INTRAMUSCULAR; INTRAVENOUS at 15:58

## 2025-04-03 RX ADMIN — DIMENHYDRINATE 50 MG: 50 TABLET ORAL at 11:12

## 2025-04-03 RX ADMIN — TAMSULOSIN HYDROCHLORIDE 0.4 MG: 0.4 CAPSULE ORAL at 17:34

## 2025-04-03 RX ADMIN — BUPIVACAINE HYDROCHLORIDE IN DEXTROSE 1.6 ML: 7.5 INJECTION, SOLUTION SUBARACHNOID at 12:41

## 2025-04-03 ASSESSMENT — PAIN SCALES - GENERAL
PAINLEVEL_OUTOF10: 0
PAINLEVEL_OUTOF10: 0
PAINLEVEL_OUTOF10: 2
PAINLEVEL_OUTOF10: 0

## 2025-04-03 ASSESSMENT — PAIN DESCRIPTION - ORIENTATION: ORIENTATION: RIGHT

## 2025-04-03 ASSESSMENT — PAIN DESCRIPTION - ONSET: ONSET: ON-GOING

## 2025-04-03 ASSESSMENT — PAIN DESCRIPTION - PAIN TYPE: TYPE: ACUTE PAIN

## 2025-04-03 ASSESSMENT — PAIN - FUNCTIONAL ASSESSMENT
PAIN_FUNCTIONAL_ASSESSMENT: NONE - DENIES PAIN
PAIN_FUNCTIONAL_ASSESSMENT: ACTIVITIES ARE NOT PREVENTED
PAIN_FUNCTIONAL_ASSESSMENT: FACE, LEGS, ACTIVITY, CRY, AND CONSOLABILITY (FLACC)

## 2025-04-03 ASSESSMENT — PAIN DESCRIPTION - DESCRIPTORS: DESCRIPTORS: DISCOMFORT

## 2025-04-03 ASSESSMENT — PAIN DESCRIPTION - LOCATION: LOCATION: LEG;HIP

## 2025-04-03 ASSESSMENT — PAIN DESCRIPTION - FREQUENCY: FREQUENCY: CONTINUOUS

## 2025-04-03 NOTE — ANESTHESIA PRE PROCEDURE
Department of Anesthesiology  Preprocedure Note       Name:  Jaden Doe   Age:  85 y.o.  :  1940                                          MRN:  [id number]         Date:  4/3/2025      Surgeon: Surgeon(s):  Koffi Perez MD    Procedure: Procedure(s):  KNEE TOTAL ARTHROPLASTY    Medications prior to admission:   Prior to Admission medications    Medication Sig Start Date End Date Taking? Authorizing Provider   naproxen (NAPROSYN) 375 MG tablet Take 1 tablet by mouth 2 times daily (with meals)   Yes ProviderSara MD   losartan (COZAAR) 50 MG tablet Take 1 tablet by mouth daily   Yes ProviderSara MD   tamsulosin (FLOMAX) 0.4 MG capsule Take 1 capsule by mouth as needed (bladder issues)   Yes ProviderSara MD   pantoprazole (PROTONIX) 20 MG tablet Take 1 tablet by mouth as needed (acid reflux)   Yes ProviderSara MD   ibuprofen (ADVIL;MOTRIN) 200 MG tablet Take 1 tablet by mouth as needed for Pain   Yes ProviderSara MD   Multiple Vitamins-Minerals (MULTIVITAL PO) Take by mouth daily   Yes Provider, MD Sara   acetaminophen (TYLENOL) 325 MG tablet Take 2 tablets by mouth as needed for Pain    ProviderSara MD   aspirin 325 MG tablet Take 1 tablet by mouth as needed for Pain    ProviderSara MD       Current medications:    Current Facility-Administered Medications   Medication Dose Route Frequency Provider Last Rate Last Admin    acetaminophen (TYLENOL) tablet 650 mg  650 mg Oral Once Orlando Stone APRN - CRNA        lactated ringers infusion   IntraVENous Continuous Orlando Stone APRN - CRNA 100 mL/hr at 25 1115 New Bag at 25 1115    tranexamic acid (CYKLOKAPRON) 1,000 mg in sodium chloride 0.9 % 100 mL IVPB ([id number])  1,000 mg IntraVENous On Call to OR Alex Lopes PA-C        tranexamic acid (CYKLOKAPRON) 1,000 mg in sodium chloride 0.9 % 100 mL IVPB ([id number])  1,000 mg IntraVENous Once PRN Alex Lopes PA-C

## 2025-04-03 NOTE — DISCHARGE INSTRUCTIONS
Total Joint Replacement  Discharge Instructions    To prevent Clot formation, you have been placed on an anticoagulant  Surgical Site Care:  Dressing to remain clean, dry, and intact until follow up in office in 2 weeks  Sutures may be present and will likely be removed at 2 week follow up   Showering is permitted over dressing as long as dressing remains fully intact. Do no shower if dressing is peeling or compromised  Physical Therapy:  Full Weight Bearing Status  Precautions   Per Physical Therapy handout  Pain Medications  You were given pain medication  Wean off pain medications as you deem appropriate as long as pain is under control  Cold packs/Ice packs/Machine  May be used 3 times daily for 15-30 minutes as necessary  Be sure to have a barrier (cloth, clothing, towel) between the site and the ice pack to prevent frostbite  Contact [facility] office if  Increased redness, swelling, drainage of any kind, and/or pain to surgery site.  As well as new onset fevers and or chills.  These could signify an infection.  Calf or thigh tenderness to touch as well as increased swelling or redness.  This could signify a clot formation.  Numbness or tingling to an area around the incision site or below the incision site (toes).  Any rash appears, increased  or new onset nausea/vomiting occur.  This may indicate a reaction to a medication.   Phone # [phone] - [facility]  Follow up with Surgeon at scheduled appointment time.     Azathioprine Pregnancy And Lactation Text: This medication is Pregnancy Category D and isn't considered safe during pregnancy. It is unknown if this medication is excreted in breast milk.

## 2025-04-03 NOTE — OP NOTE
Department of Orthopaedic Surgery  Operative Report      Patient:  Jaden Doe    YOB: 1940    MRN:  [id number]    Date of Surgery:  4/3/2025    Pre-operative Diagnosis:    1.  Right knee pain  2.  Right knee osteoarthritis    Post-operative Diagnosis:    1.  Right knee pain  2.  Right knee osteoarthritis    Procedure:    1.  Right total knee arthroplasty    Surgeon:  Koffi Perez MD     Assistant(s):    First Assistant: Moriah Quintanilla PA-C, assisted throughout the procedure with positioning, draping, retraction, wound closure, and dressing application.     Second Assistant: Scott Lopes PA-C, assisted throughout the procedure with positioning, draping, retraction, wound closure, and dressing application.     Anesthesia:  Spinal     Estimated blood loss:  100 mL     Fluids:  1400 mL crystalloid     Urine:  None      Specimens:  Bony cuts     Findings:  Tricompartmental osteoarthritis, Valgus deformity     Complications:  None     Condition:  Stable     Indications for Surgery:  Patient is an 86 y/o male who presented with right knee pain.  The patient exhausted nonoperative interventions for knee pain and arthritis and continued to have debilitating pain. Radiographs demonstrated bone on bone arthritis. Pain was affecting his quality of life and he desired to proceed with total knee arthroplasty. Discussed procedure, risks, benefits, and alternatives with patient including but not limited to bleeding, infection, neurovascular injury, knee stiffness, fracture, VTE, need for additional surgery, continued pain, and risk of anesthesia. Patient understood the risks and elected to proceed with surgery.      Implants:    Alize Persona   CR size 11 femur  Tibia size H  10 mm MC polyethylene  Patella 38 mm     Procedure:  Patient was identified and greeted in the preoperative holding area. The correct surgical site was identified and marked. Surgical consent was obtained and placed on the chart. Patient was  polyethylene was inserted. The knee capsule was then injected with joint cocktail. The knee was well irrigated and the tourniquet released. Hemostasis was obtained. The joint capsule was then closed with #2 stratafix. Final sponge and needle counts were correct. The skin and subcutaneous tissue were then closed with 2-0 monocryl and 3-0 stratafix. Sterile dressings of Prineo, 4x4, and tegaderm were applied. Patient was awakened and transferred to the patient bed. Patient was taken to PACU in stable condition.      Disposition:    Admit for pain control and IV antibiotics. PT/OT. IV antibiotics x24 hrs. WBAT. XR right knee in PACU. PO pain control. DVT ppx  mg daily, SCDs, TEDS. Nothing behind knee while lying in bed. Ice and elevation to decrease swelling.  Dressing to remain in place until follow-up in 2 weeks.  Anticipate d/c home in AM.       Koffi Perez MD  Orthopaedic Surgeon  [facility]  4/3/2025  2:52 PM

## 2025-04-03 NOTE — ANESTHESIA PROCEDURE NOTES
Spinal Block    End time: 4/3/2025 12:41 PM  Reason for block: primary anesthetic and at surgeon's request  Staffing  Performed: resident/CRNA   Resident/CRNA: Marybel Ames APRN - CRNA  Performed by: Marybel Ames APRN - CRNA  Authorized by: Marybel Ames APRN - CRNA    Spinal Block  Patient position: sitting  Prep: ChloraPrep  Patient monitoring: continuous pulse ox and frequent blood pressure checks  Approach: midline  Location: L4/L5  Guidance: paresthesia technique  Provider prep: mask and sterile gloves  Needle  Needle type: Pencan   Needle gauge: 25 G  Needle length: 3.5 in  Expiration date: 11/30/2026  Assessment  Sensory level: T10  Events: cerebrospinal fluid  Swirl obtained: Yes  CSF: clear  Attempts: 1  Hemodynamics: stable  Additional Notes  1.6 mL bupivacaine 0.75% with dextrose 8.25%  Preanesthetic Checklist  Completed: patient identified, IV checked, site marked, risks and benefits discussed, surgical/procedural consents, equipment checked, pre-op evaluation, timeout performed, anesthesia consent given, oxygen available, monitors applied/VS acknowledged, fire risk safety assessment completed and verbalized and blood product R/B/A discussed and consented

## 2025-04-03 NOTE — PROGRESS NOTES
Upon entry to the pt room, pt was visibly uncomfortable and stated he had extreme lower abdominal pain; attempted to use the urinal without success; pt was bladder scanned and it showed 814 mL; there is a prn order for straight cath - completed with 725 mL output and much relief to the patient. Pt denies any needs at this time. Care ongoing, call light within reach.

## 2025-04-03 NOTE — PROGRESS NOTES
Pt arrived to unit from OR to room 323 for s/p right total knee arthroplasty. . Pt is on room air, respirations even and unlabore. Vitals obtained. Admission assessment completed. Admission navigator completed. Pt oriented to room. Reviewed orders. Pt denies further needs at this time. Call light in reach. Care ongoing.

## 2025-04-03 NOTE — ANESTHESIA POSTPROCEDURE EVALUATION
Department of Anesthesiology  Postprocedure Note    Patient: Jaden Doe  MRN: [id number]  YOB: 1940  Date of evaluation: 4/3/2025    Procedure Summary       Date: 04/03/25 Room / Location: [address]    Anesthesia Start: 1232 Anesthesia Stop: 1515    Procedure: KNEE TOTAL ARTHROPLASTY (Right: Knee) Diagnosis:       Right knee pain, unspecified chronicity      (Right knee pain, unspecified chronicity [M25.561])    Surgeons: Koffi Perez MD Responsible Provider: Lesa Del Toro APRN - CRNA    Anesthesia Type: General, TIVA, Spinal, Regional ASA Status: 2            Anesthesia Type: General, TIVA, Spinal, Regional    Mehran Phase I: Mehran Score: 10    Mehran Phase II:      Anesthesia Post Evaluation    Patient location during evaluation: PACU  Patient participation: complete - patient participated  Level of consciousness: awake and alert  Pain score: 0  Airway patency: patent  Nausea & Vomiting: no nausea and no vomiting  Cardiovascular status: blood pressure returned to baseline and hemodynamically stable  Respiratory status: acceptable, nonlabored ventilation and spontaneous ventilation  Hydration status: euvolemic  Pain management: adequate    No notable events documented.

## 2025-04-03 NOTE — ANESTHESIA PROCEDURE NOTES
Peripheral Block    Patient location during procedure: pre-op  Reason for block: post-op pain management and at surgeon's request  Start time: 4/3/2025 11:30 AM  End time: 4/3/2025 11:38 AM  Staffing  Performed: resident/CRNA   Resident/CRNA: Marybel Ames APRN - CRNA  Performed by: Marybel Ames APRN - CRNA  Authorized by: Marybel Ames APRN - CRNA    Preanesthetic Checklist  Completed: patient identified, IV checked, site marked, risks and benefits discussed, surgical/procedural consents, equipment checked, pre-op evaluation, timeout performed, anesthesia consent given, oxygen available, monitors applied/VS acknowledged, fire risk safety assessment completed and verbalized and blood product R/B/A discussed and consented  Peripheral Block   Patient position: supine  Prep: ChloraPrep  Provider prep: mask and sterile gloves  Patient monitoring: continuous pulse ox, responsive to questions and IV access  Block type: Femoral and iPacks (Adductor canal)  Laterality: right  Injection technique: single-shot  Guidance: ultrasound guided  Local infiltration: ropivacaine  Infiltration strength: 0.5 %  Local infiltration: ropivacaine  Dose: 15 mL    Needle   Needle type: insulated echogenic nerve stimulator needle   Needle gauge: 22 G  Needle localization: ultrasound guidance  Needle insertion depth: 5 cm  Needle length: 8 cm  Assessment   Injection assessment: negative aspiration for heme, local visualized surrounding nerve on ultrasound and no intravascular symptoms  Paresthesia pain: none  Slow fractionated injection: yes  Hemodynamics: stable  Outcomes: uncomplicated and patient tolerated procedure well    Additional Notes  15 mL 0.5% ropivacaine + 20 mL PF 0.9% sodium chloride + 10 mg decadron PF

## 2025-04-03 NOTE — PROGRESS NOTES
Discharge Criteria    Inpatients must meet Criteria 1 through 7. All other patients are either YES or N/A. If a NO is chosen then Anesthesia or Surgeon must be notified.      1.  Minimum 30 minutes after last dose of sedative medication.    Yes      2.  Systolic BP between 90 - 160. Diastolic BP between 60 - 90.    Yes      3.  Pulse between 60 - 120    Yes      4.  Respirations between 8 - 25.    Yes      5.  SpO2 92% - 100%.    Yes      6.  Able to cough and swallow or return to baseline function.    Yes      7.  Alert and oriented or return to baseline mental status.    Yes

## 2025-04-04 VITALS
TEMPERATURE: 97.9 F | DIASTOLIC BLOOD PRESSURE: 71 MMHG | BODY MASS INDEX: 29.55 KG/M2 | HEART RATE: 81 BPM | SYSTOLIC BLOOD PRESSURE: 123 MMHG | OXYGEN SATURATION: 96 % | RESPIRATION RATE: 20 BRPM | WEIGHT: 218.2 LBS | HEIGHT: 72 IN

## 2025-04-04 LAB
ANION GAP SERPL CALCULATED.3IONS-SCNC: 10 MMOL/L (ref 9–16)
BUN SERPL-MCNC: 17 MG/DL (ref 8–23)
BUN/CREAT SERPL: 17 (ref 9–20)
CALCIUM SERPL-MCNC: 8.6 MG/DL (ref 8.6–10.4)
CHLORIDE SERPL-SCNC: 106 MMOL/L (ref 98–107)
CO2 SERPL-SCNC: 23 MMOL/L (ref 20–31)
CREAT SERPL-MCNC: 1 MG/DL (ref 0.7–1.2)
GFR, ESTIMATED: 73 ML/MIN/1.73M2
GLUCOSE SERPL-MCNC: 194 MG/DL (ref 74–99)
HCT VFR BLD AUTO: 37.3 % (ref 40.7–50.3)
HGB BLD-MCNC: 12.9 G/DL (ref 13–17)
POTASSIUM SERPL-SCNC: 3.9 MMOL/L (ref 3.7–5.3)
SODIUM SERPL-SCNC: 139 MMOL/L (ref 136–145)

## 2025-04-04 PROCEDURE — 97535 SELF CARE MNGMENT TRAINING: CPT

## 2025-04-04 PROCEDURE — 6360000002 HC RX W HCPCS: Performed by: PHYSICIAN ASSISTANT

## 2025-04-04 PROCEDURE — 85014 HEMATOCRIT: CPT

## 2025-04-04 PROCEDURE — 97166 OT EVAL MOD COMPLEX 45 MIN: CPT

## 2025-04-04 PROCEDURE — 80048 BASIC METABOLIC PNL TOTAL CA: CPT

## 2025-04-04 PROCEDURE — G0378 HOSPITAL OBSERVATION PER HR: HCPCS

## 2025-04-04 PROCEDURE — 6370000000 HC RX 637 (ALT 250 FOR IP): Performed by: PHYSICIAN ASSISTANT

## 2025-04-04 PROCEDURE — 36415 COLL VENOUS BLD VENIPUNCTURE: CPT

## 2025-04-04 PROCEDURE — 85018 HEMOGLOBIN: CPT

## 2025-04-04 PROCEDURE — 94761 N-INVAS EAR/PLS OXIMETRY MLT: CPT

## 2025-04-04 RX ORDER — ASPIRIN 325 MG
325 TABLET, DELAYED RELEASE (ENTERIC COATED) ORAL DAILY
Qty: 30 TABLET | Refills: 0 | Status: SHIPPED | OUTPATIENT
Start: 2025-04-04

## 2025-04-04 RX ADMIN — LOSARTAN POTASSIUM 50 MG: 50 TABLET, FILM COATED ORAL at 08:43

## 2025-04-04 RX ADMIN — Medication 2000 MG: at 04:53

## 2025-04-04 RX ADMIN — ACETAMINOPHEN 650 MG: 325 TABLET ORAL at 04:51

## 2025-04-04 RX ADMIN — ASPIRIN 325 MG: 325 TABLET, COATED ORAL at 08:43

## 2025-04-04 RX ADMIN — TAMSULOSIN HYDROCHLORIDE 0.4 MG: 0.4 CAPSULE ORAL at 08:43

## 2025-04-04 RX ADMIN — OXYCODONE HYDROCHLORIDE 5 MG: 5 TABLET ORAL at 09:46

## 2025-04-04 RX ADMIN — POLYETHYLENE GLYCOL 3350 17 G: 17 POWDER, FOR SOLUTION ORAL at 08:43

## 2025-04-04 ASSESSMENT — PAIN DESCRIPTION - LOCATION: LOCATION: KNEE

## 2025-04-04 ASSESSMENT — PAIN DESCRIPTION - DESCRIPTORS: DESCRIPTORS: ACHING

## 2025-04-04 ASSESSMENT — PAIN SCALES - GENERAL
PAINLEVEL_OUTOF10: 0
PAINLEVEL_OUTOF10: 3

## 2025-04-04 ASSESSMENT — PAIN DESCRIPTION - ORIENTATION: ORIENTATION: RIGHT

## 2025-04-04 NOTE — PLAN OF CARE
Problem: Discharge Planning  Goal: Discharge to home or other facility with appropriate resources  Outcome: Progressing  Flowsheets (Taken 4/3/2025 2223)  Discharge to home or other facility with appropriate resources: Identify barriers to discharge with patient and caregiver     Problem: Pain  Goal: Verbalizes/displays adequate comfort level or baseline comfort level  Outcome: Progressing  Flowsheets (Taken 4/3/2025 2223)  Verbalizes/displays adequate comfort level or baseline comfort level:   Encourage patient to monitor pain and request assistance   Assess pain using appropriate pain scale     Problem: Safety - Adult  Goal: Free from fall injury  Outcome: Progressing  Flowsheets (Taken 4/3/2025 2223)  Free From Fall Injury: Instruct family/caregiver on patient safety     Problem: ABCDS Injury Assessment  Goal: Absence of physical injury  Outcome: Progressing  Flowsheets (Taken 4/3/2025 2223)  Absence of Physical Injury: Implement safety measures based on patient assessment

## 2025-04-04 NOTE — DISCHARGE SUMMARY
Orthopaedic Discharge Summary      Patient Identification:   Jaden Doe   : 1940  MRN: [id number]   Account: [id number]      Patient's PCP: No primary care provider on file.    Admit Date: 4/3/2025     Discharge Date:   25    Admitting Physician: Koffi Perez MD     Discharge Physician: KEIKO Alvarenga     Discharge Diagnoses:    Active Hospital Problems    Diagnosis Date Noted    S/P total knee arthroplasty, right [Z96.651] 2025    Hypertension [I10]        The patient was seen and examined on day of discharge and this discharge summary is in conjunction with any daily progress note from day of discharge.    Operation Performed:  right total knee arthroplasty    Hospital Course:   Jaden Doe is a 85 y.o. male admitted to [facility] on 4/3/2025 for pain control and IV antibiotics s/p right TKA.  Post-operatively the patient’s diet was advanced as tolerated and their dressing remained clean, dry and intact with no signs of infection.  The patient remained neurovascularly intact in the lower extremity and had intact pulses distally.  Patient’s calf remained soft and showed no evidence of DVT.  The patient was able to move their leg and ankle/foot without any problems post-operatively.  Physical therapy and occupational therapy were consulted and began working with the patient post-operatively.  The patient progressed with PT/OT as would be expected and continued to improve through their stay.  The patients pain was initially controlled with IV medications but we were able to transition to oral pain medications soon after arrival to the floor and their pain remained under good control through their hospital stay.  From a medical standpoint the patient remained stable and continued to have the medicine team follow throughout their stay.  The patient will be discharged at this time to Home with their current diet restrictions and will continue to follow the precautions outlined to them by us

## 2025-04-04 NOTE — PROGRESS NOTES
Patient discharge paperwork reviewed with wife present, IV discontinued and site is dry, dressing remains clean, dry and intact right knee, appointments, medications and other reviewed with patient, all questions answered. To exit per wheelchair.

## 2025-04-04 NOTE — PLAN OF CARE
Problem: Discharge Planning  Goal: Discharge to home or other facility with appropriate resources  4/4/2025 1142 by Maria Elena Alanis, RN  Outcome: Completed     Problem: Pain  Goal: Verbalizes/displays adequate comfort level or baseline comfort level  4/4/2025 1142 by Maria Elena Alanis, RN  Outcome: Completed     Problem: Safety - Adult  Goal: Free from fall injury  4/4/2025 1142 by Maria Elena Alanis, RN  Outcome: Completed     Problem: ABCDS Injury Assessment  Goal: Absence of physical injury  4/4/2025 1142 by Maria Elena Alanis, RN  Outcome: Completed

## 2025-04-04 NOTE — CONSULTS
Hospitalist Consult Note      Requesting Physician:  Dr. Perez    Reason for consultation: Medical management    SUBJECTIVE:    History of Present Illness:   The patient is a 85 y.o. male who underwent an elective right total Knee replacement by Dr. Perez for degenerative arthritis and pain which was uncontrolled with outpatient conservative management.  Post-op course thus far has been uncomplicated.   His pain is well controlled post operatively.  He denies nausea and vomiting post op.  He denies any chest pain, palpitations or shortness of breath.  He did have some urinary retention postoperatively requiring catheterization however he is able to void without difficulty now.    Past Medical History:        Diagnosis Date    Enlarged prostate     Hypertension     Neck fracture (HCC)     approximately 41 yrs old treated with PT only       Past Surgical History:        Procedure Laterality Date    APPENDECTOMY      TURP         Medications Prior to Admission:    Prior to Admission medications    Medication Sig Start Date End Date Taking? Authorizing Provider   naproxen (NAPROSYN) 375 MG tablet Take 1 tablet by mouth 2 times daily (with meals)   Yes Sara Hope MD   oxyCODONE (ROXICODONE) 5 MG immediate release tablet Take 1 tablet by mouth every 6 hours as needed for Pain for up to 5 days. Intended supply: 5 days. Take lowest dose possible to manage pain Max Daily Amount: 20 mg 4/3/25 4/8/25 Yes Alex Lopes PA-C   losartan (COZAAR) 50 MG tablet Take 1 tablet by mouth daily   Yes Sara Hope MD   tamsulosin (FLOMAX) 0.4 MG capsule Take 1 capsule by mouth as needed (bladder issues)   Yes Sara Hope MD   pantoprazole (PROTONIX) 20 MG tablet Take 1 tablet by mouth as needed (acid reflux)   Yes Sara Hope MD   ibuprofen (ADVIL;MOTRIN) 200 MG tablet Take 1 tablet by mouth as needed for Pain   Yes Sara Hope MD   Multiple Vitamins-Minerals (MULTIVITAL PO) Take  N/A  Management and/or test interpretation discussed with orthopedic surgeon at time of admission  Consults and Nursing notes were personally reviewed, all current labs and imaging were personally reviewed, tests ordered: CBC, BMP, and history obtained by independent historian       Disposition:  Shared decision making: All test results, treatment options and disposition options were discussed with the patient today  Social determinants of health that may impact management: none  Code status: Full Code   Disposition: Discharge plan is home    [facility] Medication Reconciliation documentation:    [x] I have utilized all available immediate resources to obtain, update, or review the patient's current medications (including all prescriptions, over-the-counter products, herbals, cannabinoid products and bitamin/mineral/dietary/nutritional supplements.  [If 'yes\", STOP HERE]     []  The patient is not eligible for medication reconciliation; the patient is in an emergent medical situation where delaying treatment would jeopardize the patient's health    []  I did NOT confirm, update or review the patient's current list of medications today.  [DOES NOT SATISFY [facility] PERFORMANCE]      RONEN Snyder - CNP, RONEN, NP-C  4/3/2025, 10:44 PM

## 2025-04-04 NOTE — PROGRESS NOTES
Occupational Therapy  Facility/Department: [facility] MED SURG  Occupational Therapy Initial Assessment    Name: Jaden Doe  : 1940  MRN: [id number]  Date of Service: 2025    Discharge Recommendations:  Home with assist PRN          Patient Diagnosis(es): The encounter diagnosis was S/P total knee arthroplasty, right.  Past Medical History:  has a past medical history of Enlarged prostate, Hypertension, and Neck fracture (HCC).  Past Surgical History:  has a past surgical history that includes TURP and Appendectomy.    Treatment Diagnosis: Weakness / TKA      Assessment  Performance deficits / Impairments: Decreased functional mobility ;Decreased safe awareness;Decreased balance;Decreased ADL status;Decreased endurance;Decreased high-level IADLs;Decreased strength  Assessment: 86 y/o M admitted to [facility] for R TKA resulting in increased need for assist during ADL. Patient would benefit from OT services to address to improve ADL independence and provide safe return home.  Treatment Diagnosis: Weakness / TKA  Prognosis: Good  Decision Making: Medium Complexity  REQUIRES OT FOLLOW-UP: Yes     Plan  Occupational Therapy Plan  Times Per Day: Once a day  Days Per Week: 7 Days  Current Treatment Recommendations: Strengthening, Balance training, Functional mobility training, Endurance training, Safety education & training, Equipment evaluation, education, & procurement, Patient/Caregiver education & training, Self-Care / ADL    Restrictions  Restrictions/Precautions  Restrictions/Precautions: Fall Risk, General Precautions    Subjective  General  Chart Reviewed: Yes  Patient assessed for rehabilitation services?: Yes  Family / Caregiver Present: No  General Comment  Comments: Pt supine in bed upon arrival, denies pain and agreeable to OT eval.     Social/Functional History  Social/Functional History  Lives With: Spouse  Type of Home: House  Home Layout: One level  Home Access: Stairs to enter with rails  Entrance  Activity - Inpatient   How much help is needed for putting on and taking off regular lower body clothing?: A Little  How much help is needed for bathing (which includes washing, rinsing, drying)?: A Little  How much help is needed for toileting (which includes using toilet, bedpan, or urinal)?: None  How much help is needed for putting on and taking off regular upper body clothing?: None  How much help is needed for taking care of personal grooming?: None  How much help for eating meals?: None  AM-[facility] Inpatient Daily Activity Raw Score: 22  AM-PAC Inpatient ADL T-Scale Score : 47.1  ADL Inpatient CMS 0-100% Score: 25.8  ADL Inpatient CMS G-Code Modifier : CJ           Goals  Short Term Goals  Time Frame for Short Term Goals: 21 visits  Short Term Goal 1: Pt will be independent and knowledgable with with D/C folder including AE / DME.  Short Term Goal 2: Pt will complete self care / ADL's with SBA.      Therapy Time   Individual Concurrent Group Co-treatment   Time In 0830         Time Out 0908         Minutes 38                 Dahiana Kessler, OT

## 2025-04-04 NOTE — PROGRESS NOTES
Occupational Therapy  Facility/Department: [facility] MED SURG  Daily Treatment Note  NAME: Jaden Doe  : 1940  MRN: [id number]    Date of Service: 2025    Discharge Recommendations:  Home with assist PRN         Patient Diagnosis(es): The encounter diagnosis was S/P total knee arthroplasty, right.     Assessment   Activity Tolerance: Patient tolerated treatment well  Discharge Recommendations: Home with assist PRN     Plan  Occupational Therapy Plan  Times Per Day: Once a day  Days Per Week: 7 Days  Current Treatment Recommendations: Strengthening;Balance training;Functional mobility training;Endurance training;Safety education & training;Equipment evaluation, education, & procurement;Patient/Caregiver education & training;Self-Care / ADL    Restrictions  Restrictions/Precautions  Restrictions/Precautions: Fall Risk;General Precautions    Subjective  Subjective: Pt seated in recliner, agreed to d/c edu Pt reports his daughter works for a DME company, Pt had limited questions.  Pain: post surgical pain, did not rate.     Objective  Vitals           ADL  Feeding: Independent  Grooming: Stand by assistance  UE Bathing: Stand by assistance  LE Bathing: Contact guard assistance  UE Dressing: Independent  LE Dressing: Stand by assistance  Putting On/Taking Off Footwear: Minimal assistance  Toileting: Stand by assistance  Functional Mobility: Contact guard assistance  Functional Mobility Skilled Clinical Factors: Impulsive, initially, with RW use; educated on safety and pt completed transfers with SBA/ CGA.        Safety Devices  Type of Devices: All fall risk precautions in place;Call light within reach;Chair alarm in place;Left in chair    Patient Education  Education Given To: Patient  Education Provided: Role of Therapy;Plan of Care;Home Exercise Program;Precautions;ADL Adaptive Strategies;IADL Safety;Equipment  Education Provided Comments: Pt provided education on d/c folder.  Education Method:  Demonstration;Verbal;Printed Information/Hand-outs  Barriers to Learning: None  Education Outcome: Verbalized understanding;Demonstrated understanding    Goals  Short Term Goals  Time Frame for Short Term Goals: 21 visits  Short Term Goal 1: Pt will be independent and knowledgable with with D/C folder including AE / DME.  Short Term Goal 2: Pt will complete self care / ADL's with SBA.    AM-PAC - ADL       Therapy Time   Individual Concurrent Group Co-treatment   Time In 0925         Time Out 0935         Minutes 10                 CHARBEL Sanchez S/CHARBEL

## 2025-04-04 NOTE — DISCHARGE INSTR - DIET

## 2025-04-04 NOTE — PROGRESS NOTES
Entered room and patient is resting in the bed. Vitals and assessment completed at this time, see flowsheet for details. A&O x4. Patient rated pain 2/10 discomfort in R leg/hip and declines anything for it. Dressing intact with a small amount of drainage noted. 2+ pedal pulse noted. Patient denies any further needs at this time. Call light in reach and bed alarm on.

## 2025-04-04 NOTE — CARE COORDINATION
04/04/25 1000   IMM Letter   Observation Status Letter date given: 04/04/25   Observation Status Letter time given: 0930   Observation Status Letter given to Patient/Family/Significant other/Guardian/POA/by: To patient/KIANA Merrill case manager

## 2025-04-04 NOTE — PROGRESS NOTES
Patient resting in bed, dressing intact, clean and dry, assessment and vitals initiated, call light within reach, whiteboard updated.

## 2025-04-04 NOTE — PROGRESS NOTES
Progress Note    Subjective:     Post-Operative Day: 1 Status Post right Total Knee Arthroplasty    Patient resting comfortably in bed. Pain well controlled at this time. Did require straight cath yesterday evening while spinal was still working. Urinating with some hesitancy overnight. Patient states this is normal for him at baseline. Denies chest pain, shortness of breath, nausea, lightheadedness, dizziness, calf pain, or numbness/tingling in his toes and feet     Objective:   VITALS:  /73   Pulse 79   Temp 97.2 °F (36.2 °C) (Temporal)   Resp 18   Ht 1.829 m (6')   Wt 99 kg (218 lb 3.2 oz)   SpO2 95%   BMI 29.59 kg/m²   24HR INTAKE/OUTPUT:    Intake/Output Summary (Last 24 hours) at 2025 0609  Last data filed at 2025 0503  Gross per 24 hour   Intake 1500 ml   Output 1325 ml   Net 175 ml     TEMPERATURE:  Current - Temp: 97.2 °F (36.2 °C); Max - Temp  Av.9 °F (36.1 °C)  Min: 96.7 °F (35.9 °C)  Max: 97.2 °F (36.2 °C)  RESPIRATIONS RANGE: Resp  Avg: 15.2  Min: 11  Max: 20  PULSE RANGE: Pulse  Av.7  Min: 64  Max: 98  BLOOD PRESSURE RANGE:  Systolic (24hrs), Av , Min:122 , Max:184  ; Diastolic (24hrs), Av, Min:64, Max:110   PULSE OXIMETRY RANGE: SpO2  Av.3 %  Min: 92 %  Max: 96 %       General: alert, appears stated age, cooperative, and no distress   Wound: Wound clean and dry no evidence of infection., No Erythema, and No Drainage   DVT Exam: No evidence of DVT seen on physical exam.  Negative Moises's sign.  No cords or calf tenderness.  No significant calf/ankle edema.     Right LE:  Knee swollen but thigh soft to palpation. Thigh soft and compressible. Motor intact quad, hamstring, TA, GSC, EHL, FHL. SILT SPN, DPN, sural, saphenous, tibial nerve distribution. 2+ DP pulse. Toes warm and well perfused.      Data Review  CBC:   Lab Results   Component Value Date/Time    WBC 5.9 2025 01:18 PM    RBC 4.81 2025 01:18 PM    HGB 12.9 2025 05:10 AM    HCT 37.3

## 2025-04-04 NOTE — PROGRESS NOTES
Sasha Erik, APRN-CNP -- Hospitalist  Progress Note 4/4/25    SUBJECTIVE:    Patient seen for f/u of post op medical management of HTN.   He is POD # 1 s/p right TKR.  His pain is well controlled.  He denies nausea and vomiting.  Last BM was prior to surgery.  He denies any chest pain, palpitations or shortness of breath.  He is doing well with PT    ROS:   Constitutional: negative  for fevers, and negative for chills.  Respiratory: negative for shortness of breath, negative for cough, and negative for wheezing  Cardiovascular: negative for chest pain, and negative for palpitations  Gastrointestinal: negative for abdominal pain, negative for nausea,negative for vomiting, negative for diarrhea, and negative for constipation    OBJECTIVE:    Vitals:   Temp: 97.2 °F (36.2 °C)  BP: 122/73  Respirations: 18  Pulse: 79  SpO2: 95 %    24HR INTAKE/OUTPUT:    Intake/Output Summary (Last 24 hours) at 4/4/2025 0709  Last data filed at 4/4/2025 0503  Gross per 24 hour   Intake 1500 ml   Output 1325 ml   Net 175 ml      -----------------------------------------------------------------    Exam:  GEN:    Awake, alert and oriented x3.   EYES:  EOMI, pupils equal   NECK: Supple. No lymphadenopathy.  No carotid bruit  CVS:    regular rate and rhythm, no audible murmur  PULM:  CTA, no wheezes, rales or rhonchi, no acute respiratory distress  ABD:    Bowels sounds normal.  Abdomen is soft.  No distention.  no tenderness to palpation.   EXT:   no edema bilaterally .  No calf tenderness.   NEURO: Moves all extremities.  Motor and sensory are grossly intact   SKIN:  Incision is dressed with minimal drainage.     Diagnostic Data:  Lab Results   Component Value Date    HGB 12.9 (L) 04/04/2025      Lab Results   Component Value Date    GLUCOSE 194 (H) 04/04/2025    BUN 17 04/04/2025    CREATININE 1.0 04/04/2025     04/04/2025    K 3.9 04/04/2025    CALCIUM 8.6 04/04/2025     04/04/2025    CO2 23 04/04/2025       PROBLEM

## 2025-04-04 NOTE — CARE COORDINATION
Case Management Assessment  Initial Evaluation    Date/Time of Evaluation: 4/4/2025 10:05 AM  Assessment Completed by: Osito Go RN    If patient is discharged prior to next notation, then this note serves as note for discharge by case management.    Patient Name: Jaden Doe                   YOB: 1940  Diagnosis: Right knee pain, unspecified chronicity [M25.561]  S/P total knee arthroplasty, right [Z96.651]                   Date / Time: 4/3/2025 10:28 AM    Patient Admission Status: Observation   Readmission Risk (Low < 19, Mod (19-27), High > 27): No data recorded  Current PCP: No primary care provider on file.  PCP verified by CM? (P) Yes    Chart Reviewed: Yes      History Provided by: (P) Patient  Patient Orientation: (P) Alert and Oriented, Person, Place    Patient Cognition: (P) Alert    Hospitalization in the last 30 days (Readmission):  No    If yes, Readmission Assessment in  Navigator will be completed.    Advance Directives:      Code Status: Full Code   Patient's Primary Decision Maker is: (P) Legal Next of Kin      Discharge Planning:    Patient lives with: (P) Spouse/Significant Other Type of Home: (P) House  Primary Care Giver: (P) Self  Patient Support Systems include: (P) Spouse/Significant Other, Family Members, Friends/Neighbors   Current Financial resources: (P) Medicare  Current community resources: (P) None  Current services prior to admission: (P) Durable Medical Equipment            Current DME: (P) Walker, Shower Chair            Type of Home Care services:  (P) None    ADLS  Prior functional level: (P) Independent in ADLs/IADLs  Current functional level: (P) Assistance with the following:, Housework, Shopping    PT AM-PAC:   /24  OT AM-PAC: 22 /24    Family can provide assistance at DC: (P) Yes  Would you like Case Management to discuss the discharge plan with any other family members/significant others, and if so, who? (P) No  Plans to Return to Present Housing:      Osito Go RN  Case Management Department  Ph: [phone] Fax: [phone]

## (undated) DEVICE — ADHESIVE SKIN CLOSURE WND 8.661X1.5 IN 22 CM LIQUIBAND SECUR

## (undated) DEVICE — MARKER,SKIN,WI/RULER AND LABELS: Brand: MEDLINE

## (undated) DEVICE — SOLUTION IRRIG 3000ML 0.9% SOD CHL USP UROMATIC PLAS CONT

## (undated) DEVICE — TOWEL,OR,DSP,ST,NATURAL,DLX,4/PK,20PK/CS: Brand: MEDLINE

## (undated) DEVICE — MERCY TIFFIN TOTAL KNEE: Brand: MEDLINE INDUSTRIES, INC.

## (undated) DEVICE — GLOVE SURG SZ 75 CRM LTX FREE POLYISOPRENE POLYMER BEAD ANTI

## (undated) DEVICE — GLOVE SURG SZ 75 L12IN FNGR THK79MIL GRN LTX FREE

## (undated) DEVICE — ZIMMER® STERILE DISPOSABLE TOURNIQUET CUFF WITH PROTECTIVE SLEEVE AND PLC, SINGLE PORT, SINGLE BLADDER, 30 IN. (76 CM)

## (undated) DEVICE — UNDERGLOVE BIOGEL ULTRATOUCH S INDICATOR SZ 8

## (undated) DEVICE — DRESSING TRNSPAR W8XL12IN FLM SURESITE 123

## (undated) DEVICE — INSTRUMENT SCREW BNE L48MM CONSTRN CNDYL KNEE HEX HD STEM FOR LEG NXGN

## (undated) DEVICE — GLOVE ORANGE PI 7 1/2   MSG9075

## (undated) DEVICE — PENCIL SMK EVAC TELSCP 3 M TBNG

## (undated) DEVICE — 450 ML BOTTLE OF 0.05% CHLORHEXIDINE GLUCONATE IN 99.95% STERILE WATER FOR IRRIGATION, USP AND APPLICATOR.: Brand: IRRISEPT ANTIMICROBIAL WOUND LAVAGE

## (undated) DEVICE — INTENDED FOR TISSUE SEPARATION, AND OTHER PROCEDURES THAT REQUIRE A SHARP SURGICAL BLADE TO PUNCTURE OR CUT.: Brand: BARD-PARKER ® STAINLESS STEEL BLADES

## (undated) DEVICE — SUTURE STRATAFIX SZ 3-0 30CM NONABSORB UD 26MM FS 3/8 SXMP2B412

## (undated) DEVICE — SYRINGE 20ML LL S/C 50

## (undated) DEVICE — GOWN,SIRUS,POLYRNF,BRTHSLV,XL,30/CS: Brand: MEDLINE

## (undated) DEVICE — SUTURE PDS II SZ 1 L36IN ABSRB VLT L48MM CTX 1/2 CIR Z371T

## (undated) DEVICE — COVER,MAYO STAND,STERILE: Brand: MEDLINE

## (undated) DEVICE — CEMENT MIXING SYSTEM WITH FEMORAL BREAKWAY NOZZLE: Brand: REVOLUTION

## (undated) DEVICE — INSTRUMENT SCREW BNE L25MM DIA2.5MM KNEE FULL THRD HEX FEM PERSONA

## (undated) DEVICE — SUTURE ABSORBABLE MONOFILAMENT 1 CTX 36 CM 48 MM VIO PDS +

## (undated) DEVICE — PIN HOLDING HDLSS 3.2X75 MM TROCAR ZUK

## (undated) DEVICE — SUTURE MONOCRYL SZ 2-0 L27IN ABSRB UD CP-1 1 L36MM 1/2 CIR REV Y266H

## (undated) DEVICE — SPONGE GZ W4XL4IN COT 12 PLY TYP VII WVN C FLD DSGN STERILE

## (undated) DEVICE — GOWN,REINF,POLY,AURORA,XLNG/XL,STRL: Brand: MEDLINE